# Patient Record
(demographics unavailable — no encounter records)

---

## 2025-03-10 NOTE — PHYSICAL EXAM
[Binocular Microscopic Exam] : Binocular microscopic exam was performed [de-identified] : EOMI/PERRL w/ no resting nystagmus; CN 2-12 intact; good smooth pursuit; negative fistula test AU; negative [place] Hallpike & supine roll test bilaterally using Frenzel goggles; normal Hamalgyi head thrust; negative enhanced Rhomberg; unremarkable gait; no dysdiadochokinesia [Normal] : no masses and lesions seen, face is symmetric

## 2025-03-10 NOTE — ASSESSMENT
[FreeTextEntry1] : We discussed her periods of momentary dizziness which are non-positional in light of her normal exam; RTC for any worsening to consider a VNG  We discussed general tinnitus treatment including masking, amplification when appropriate, alternative treatments, helpful smartphone applications, CBT and OTC medications. RTC 1 year unless sxs worsen or change

## 2025-03-10 NOTE — CONSULT LETTER
[Dear  ___] : Dear  [unfilled], [Consult Letter:] : I had the pleasure of evaluating your patient, [unfilled]. [Please see my note below.] : Please see my note below. [Consult Closing:] : Thank you very much for allowing me to participate in the care of this patient.  If you have any questions, please do not hesitate to contact me. [Sincerely,] : Sincerely, [FreeTextEntry3] : EAGLE Quinones Jr, MD, FAAOHNS Otolaryngologist [facility]

## 2025-03-10 NOTE — HISTORY OF PRESENT ILLNESS
[de-identified] : Momentary periods (seconds) of dizziness without spinning intermittently over the last 6 months; this is non-positional in nature. She denies jumbling of the panorama. Three months of constant worsening bilateral white-noise like tinnitus and a high-pitched noise in her R ear; this is all quite bothersome. She feels like her hearing is ok. Denies: ear pain, drainage, frequent loud noise exp/shooting, frequent infections, hx of ear surgery or IV antibiotics/chemo; denies a FHx of hearing loss. Qtip use: no